# Patient Record
Sex: FEMALE | Race: WHITE | NOT HISPANIC OR LATINO | URBAN - METROPOLITAN AREA
[De-identification: names, ages, dates, MRNs, and addresses within clinical notes are randomized per-mention and may not be internally consistent; named-entity substitution may affect disease eponyms.]

---

## 2018-06-18 ENCOUNTER — OFFICE VISIT (OUTPATIENT)
Dept: URBAN - METROPOLITAN AREA CLINIC 193 | Facility: CLINIC | Age: 78
End: 2018-06-18
Payer: COMMERCIAL

## 2018-06-18 DIAGNOSIS — H04.123 DRY EYE SYNDROME OF BILATERAL LACRIMAL GLANDS: ICD-10-CM

## 2018-06-18 DIAGNOSIS — H02.423 MYOGENIC PTOSIS OF BILATERAL EYELIDS: ICD-10-CM

## 2018-06-18 DIAGNOSIS — H43.813 VITREOUS DEGENERATION, BILATERAL: ICD-10-CM

## 2018-06-18 PROCEDURE — 92014 COMPRE OPH EXAM EST PT 1/>: CPT | Performed by: OPTOMETRIST

## 2018-06-18 ASSESSMENT — KERATOMETRY
OD: 42.25
OS: 41.75

## 2018-06-18 ASSESSMENT — VISUAL ACUITY
OS: 20/40-
OD: 20/40+

## 2018-06-18 ASSESSMENT — INTRAOCULAR PRESSURE
OD: 15
OS: 15

## 2018-06-18 NOTE — IMPRESSION/PLAN
Impression: Age-related nuclear cataract, bilateral: H25.13. Vision affected. Plan: Cataracts account for the patient's complaints. Patient understands changing glasses will not significantly improve vision. Cataract surgery should improve vision. Patient willing to have surgery. Recommend cataract surgery OU. OD first. Premium lenses discussed. Consider STANDARD TORIC MULTIFOCAL lenses. DISTANCE refractive target. No reported health issues that should hinder surgery. Will not help or hurt droopy eyelids.

## 2018-06-18 NOTE — IMPRESSION/PLAN
Impression: Dry eye syndrome of bilateral lacrimal glands: H04.123. Doing warm compresses and using Visine ATs. Using air purifier instead of humidifier. Plan: Continue ATs PRN. Preservative free artificial tears if more than QID. Continue warm compresses 2 x day, humidifier, blink frequently particularly when on the computer/tv/reading. Call if worsens.

## 2018-07-02 ENCOUNTER — PRE-OPERATIVE VISIT (OUTPATIENT)
Dept: URBAN - METROPOLITAN AREA CLINIC 193 | Facility: CLINIC | Age: 78
End: 2018-07-02
Payer: COMMERCIAL

## 2018-07-02 PROCEDURE — 92136 OPHTHALMIC BIOMETRY: CPT | Performed by: OPHTHALMOLOGY

## 2018-07-12 ENCOUNTER — OFFICE VISIT (OUTPATIENT)
Dept: URBAN - METROPOLITAN AREA CLINIC 193 | Facility: CLINIC | Age: 78
End: 2018-07-12
Payer: COMMERCIAL

## 2018-07-12 DIAGNOSIS — H52.4 PRESBYOPIA: ICD-10-CM

## 2018-07-12 DIAGNOSIS — H25.13 AGE-RELATED NUCLEAR CATARACT, BILATERAL: Primary | ICD-10-CM

## 2018-07-12 PROCEDURE — 99204 OFFICE O/P NEW MOD 45 MIN: CPT | Performed by: OPHTHALMOLOGY

## 2018-07-12 PROCEDURE — 76519 ECHO EXAM OF EYE: CPT | Performed by: OPHTHALMOLOGY

## 2018-07-12 PROCEDURE — 99214 OFFICE O/P EST MOD 30 MIN: CPT | Performed by: OPHTHALMOLOGY

## 2018-07-12 RX ORDER — OFLOXACIN 3 MG/ML
0.3 % SOLUTION/ DROPS OPHTHALMIC
Qty: 1 | Refills: 1 | Status: INACTIVE
Start: 2018-07-12 | End: 2018-08-23

## 2018-07-12 RX ORDER — DUREZOL 0.5 MG/ML
0.05 % EMULSION OPHTHALMIC
Qty: 1 | Refills: 2 | Status: ACTIVE
Start: 2018-07-12

## 2018-07-12 ASSESSMENT — PACHYMETRY
OD: 24.22
OS: 24.36

## 2018-07-12 ASSESSMENT — VISUAL ACUITY
OD: 20/40-
OS: 20/40-

## 2018-07-12 ASSESSMENT — INTRAOCULAR PRESSURE
OD: 15
OS: 16

## 2018-07-12 ASSESSMENT — KERATOMETRY: OS: 42.00

## 2018-07-12 NOTE — IMPRESSION/PLAN
Impression: Age-related nuclear cataract, bilateral: H25.13.  OU. Visually significant Plan: Cataracts account for the patient's complaints. Discussed all risks, benefits, procedures and recovery. Patient understands changing glasses will not improve vision. Discussed added risk due to astigmatism. Patient desires to have surgery, recommend CE IOL OU, OD first.  Discussed iol options, recommend STANDARD/TORIC (patient would like to proceed with STANDARD)   IOL . TARGET: DISTANCE   RL2 Discussed  astigmatism diagnosis with Patient. Patient understands  that Standard lens does not correct for Astigmatism and patient will need gls distance and near after Cataract Surgery. Patient can consider toric iol advised additional testing will need to be done for final iol recommendations.   Patient understands

## 2018-07-12 NOTE — IMPRESSION/PLAN
Impression: Dry eye syndrome of bilateral lacrimal glands: H04.123. Plan: Explained condition does not have a cure and will need artificial tears for maintenance.

## 2018-07-18 ENCOUNTER — SURGERY (OUTPATIENT)
Dept: URBAN - METROPOLITAN AREA SURGERY 124 | Facility: SURGERY | Age: 78
End: 2018-07-18
Payer: COMMERCIAL

## 2018-07-18 PROCEDURE — 66984 XCAPSL CTRC RMVL W/O ECP: CPT | Performed by: OPHTHALMOLOGY

## 2018-07-19 ENCOUNTER — POST-OPERATIVE VISIT (OUTPATIENT)
Dept: URBAN - METROPOLITAN AREA CLINIC 193 | Facility: CLINIC | Age: 78
End: 2018-07-19

## 2018-07-19 PROCEDURE — 99024 POSTOP FOLLOW-UP VISIT: CPT | Performed by: OPTOMETRIST

## 2018-07-19 ASSESSMENT — INTRAOCULAR PRESSURE
OD: 21
OS: 15

## 2018-07-25 ENCOUNTER — POST-OPERATIVE VISIT (OUTPATIENT)
Dept: URBAN - METROPOLITAN AREA CLINIC 193 | Facility: CLINIC | Age: 78
End: 2018-07-25

## 2018-07-25 DIAGNOSIS — Z09 ENCNTR FOR F/U EXAM AFT TRTMT FOR COND OTH THAN MALIG NEOPLM: Primary | ICD-10-CM

## 2018-07-25 PROCEDURE — 99024 POSTOP FOLLOW-UP VISIT: CPT | Performed by: OPTOMETRIST

## 2018-07-25 ASSESSMENT — INTRAOCULAR PRESSURE
OS: 15
OD: 18

## 2018-08-01 ENCOUNTER — SURGERY (OUTPATIENT)
Dept: URBAN - METROPOLITAN AREA SURGERY 124 | Facility: SURGERY | Age: 78
End: 2018-08-01
Payer: COMMERCIAL

## 2018-08-01 PROCEDURE — 66984 XCAPSL CTRC RMVL W/O ECP: CPT | Performed by: OPHTHALMOLOGY

## 2018-08-02 ENCOUNTER — POST-OPERATIVE VISIT (OUTPATIENT)
Dept: URBAN - METROPOLITAN AREA CLINIC 193 | Facility: CLINIC | Age: 78
End: 2018-08-02

## 2018-08-02 PROCEDURE — 99024 POSTOP FOLLOW-UP VISIT: CPT | Performed by: OPTOMETRIST

## 2018-08-02 ASSESSMENT — INTRAOCULAR PRESSURE
OD: 18
OS: 21

## 2018-08-08 ENCOUNTER — POST-OPERATIVE VISIT (OUTPATIENT)
Dept: URBAN - METROPOLITAN AREA CLINIC 193 | Facility: CLINIC | Age: 78
End: 2018-08-08

## 2018-08-08 PROCEDURE — 99024 POSTOP FOLLOW-UP VISIT: CPT | Performed by: OPTOMETRIST

## 2018-08-08 ASSESSMENT — INTRAOCULAR PRESSURE
OS: 16
OD: 16

## 2018-08-23 ENCOUNTER — POST-OPERATIVE VISIT (OUTPATIENT)
Dept: URBAN - METROPOLITAN AREA CLINIC 193 | Facility: CLINIC | Age: 78
End: 2018-08-23

## 2018-08-23 PROCEDURE — 99024 POSTOP FOLLOW-UP VISIT: CPT | Performed by: OPHTHALMOLOGY

## 2018-08-23 ASSESSMENT — VISUAL ACUITY
OD: 20/30+
OS: 20/30+

## 2018-08-23 ASSESSMENT — INTRAOCULAR PRESSURE
OD: 18
OS: 16

## 2018-09-13 ENCOUNTER — POST-OPERATIVE VISIT (OUTPATIENT)
Dept: URBAN - METROPOLITAN AREA CLINIC 193 | Facility: CLINIC | Age: 78
End: 2018-09-13

## 2018-09-13 PROCEDURE — 99024 POSTOP FOLLOW-UP VISIT: CPT | Performed by: OPTOMETRIST

## 2018-09-13 ASSESSMENT — VISUAL ACUITY
OD: 20/30+
OS: 20/30+

## 2018-09-13 ASSESSMENT — INTRAOCULAR PRESSURE
OS: 15
OD: 15

## 2020-01-16 ENCOUNTER — Encounter (OUTPATIENT)
Dept: URBAN - METROPOLITAN AREA CLINIC 71 | Facility: CLINIC | Age: 80
End: 2020-01-16
Payer: COMMERCIAL

## 2020-01-16 PROCEDURE — 92014 COMPRE OPH EXAM EST PT 1/>: CPT | Performed by: OPTOMETRIST

## 2020-02-06 ENCOUNTER — OFFICE VISIT (OUTPATIENT)
Dept: URBAN - METROPOLITAN AREA CLINIC 71 | Facility: CLINIC | Age: 80
End: 2020-02-06
Payer: COMMERCIAL

## 2020-02-06 PROCEDURE — 92014 COMPRE OPH EXAM EST PT 1/>: CPT | Performed by: OPTOMETRIST

## 2020-02-06 ASSESSMENT — INTRAOCULAR PRESSURE
OD: 15
OS: 17

## 2020-02-06 NOTE — IMPRESSION/PLAN
Impression: Vitreous degeneration, bilateral: H43.813. Bilateral.
Optos 02/06/2020 Plan: The PVD is stable, and there is no evidence of a retinal tear or detachment on dilated exam with scleral depression. I again reviewed the signs and symptoms of a retinal tear and detachment in detail with the patient, including worsening flashes, new floaters, and development of a shadow/curtain in the peripheral visual field. The patient was advised to call immediately with any changes to [address] [facility] or increase in symptoms.

## 2020-02-06 NOTE — IMPRESSION/PLAN
Impression: Dry eye syndrome of bilateral lacrimal glands: H04.123. Bilateral. Plan: Continue Systane as needed.

## 2020-07-16 ENCOUNTER — OFFICE VISIT (OUTPATIENT)
Dept: URBAN - METROPOLITAN AREA CLINIC 71 | Facility: CLINIC | Age: 80
End: 2020-07-16
Payer: COMMERCIAL

## 2020-07-16 DIAGNOSIS — Z96.1 PRESENCE OF INTRAOCULAR LENS: ICD-10-CM

## 2020-07-16 DIAGNOSIS — G43.109 MIGRAINE W/ AURA: Primary | ICD-10-CM

## 2020-07-16 PROCEDURE — 92014 COMPRE OPH EXAM EST PT 1/>: CPT | Performed by: OPTOMETRIST

## 2020-07-16 ASSESSMENT — INTRAOCULAR PRESSURE
OS: 18
OD: 15

## 2020-07-16 NOTE — IMPRESSION/PLAN
Impression: Migraine w/ aura: G43.109. Plan: OU: Discussed diagnosis in detail with patient. Pt advised to watch frequency and severity and watch for possible triggers.  Call if worsening

## 2021-02-11 ENCOUNTER — OFFICE VISIT (OUTPATIENT)
Dept: URBAN - METROPOLITAN AREA CLINIC 71 | Facility: CLINIC | Age: 81
End: 2021-02-11
Payer: COMMERCIAL

## 2021-02-11 DIAGNOSIS — H52.223 REGULAR ASTIGMATISM, BILATERAL: ICD-10-CM

## 2021-02-11 PROCEDURE — 99214 OFFICE O/P EST MOD 30 MIN: CPT | Performed by: OPTOMETRIST

## 2021-02-11 ASSESSMENT — VISUAL ACUITY
OS: 20/25
OD: 20/25

## 2021-02-11 ASSESSMENT — INTRAOCULAR PRESSURE
OS: 15
OD: 16

## 2021-02-11 NOTE — IMPRESSION/PLAN
Impression: Regular astigmatism, bilateral: H52.223. Plan: Astigmatism causes blurred vision due to either an irregularly shaped cornea or the curvature of the lens. Small amounts of astigmatism do not need to be treated, but larger amounts can cause visual distortion, blurred vision, eye strain and headaches. New glasses RX given today.

## 2022-04-06 NOTE — IMPRESSION/PLAN
Impression: Dry eye syndrome of bilateral lacrimal glands: H04.123 Bilateral. Plan: Dry eye syndrome requires lubrication of the eye with artificial tears and nighttime ointments or gels. In addition, topical and oral anti-inflammatory medications are usually necessary to treat the associated ocular irritation. Contact the office if dry eye does not improve, worsens or blurs vision. Recommend 3-4 drops daily of Systane Balance. 30.1

## 2022-04-13 ENCOUNTER — OFFICE VISIT (OUTPATIENT)
Dept: URBAN - METROPOLITAN AREA CLINIC 72 | Facility: CLINIC | Age: 82
End: 2022-04-13
Payer: COMMERCIAL

## 2022-04-13 DIAGNOSIS — H04.123 DRY EYE SYNDROME OF BILATERAL LACRIMAL GLANDS: ICD-10-CM

## 2022-04-13 DIAGNOSIS — H43.813 VITREOUS DEGENERATION, BILATERAL: Primary | ICD-10-CM

## 2022-04-13 DIAGNOSIS — H02.889 MEIBOMIAN GLAND DYSFUNCTION OF EYE: ICD-10-CM

## 2022-04-13 PROCEDURE — 99213 OFFICE O/P EST LOW 20 MIN: CPT

## 2022-04-13 NOTE — IMPRESSION/PLAN
Impression: Dry eye syndrome of bilateral lacrimal glands: H04.123 Bilateral. Plan: Recommend pt to continue with OTC artificial tear use 2-4x daily w/ emphasis on QAM and QHS doses. Discussed possible improvement with thicker gel or ointment QHS. Recommend warm compress w/ lid massage. If symptoms continue/worsen will consider prescription drug therapy.

## 2022-04-13 NOTE — IMPRESSION/PLAN
Impression: Vitreous degeneration, bilateral: H43.813. Optos ordered and done today PVD, no holes/tear/hemes OU Plan: Discussed diagnosis in detail with patient. No treatment is required at this time. Reassured patient of current condition and treatment. Will continue to observe condition and or symptoms. Discussed signs and symptoms of PVD/floaters. Discussed signs and symptoms of retinal detachment.

## 2023-04-19 ENCOUNTER — OFFICE VISIT (OUTPATIENT)
Dept: URBAN - METROPOLITAN AREA CLINIC 72 | Facility: CLINIC | Age: 83
End: 2023-04-19
Payer: MEDICARE

## 2023-04-19 DIAGNOSIS — H04.123 DRY EYE SYNDROME OF BILATERAL LACRIMAL GLANDS: Primary | ICD-10-CM

## 2023-04-19 DIAGNOSIS — H00.023 HORDEOLUM INTERNUM OF RIGHT EYELID: ICD-10-CM

## 2023-04-19 DIAGNOSIS — H01.8 OTHER SPECIFIED INFLAMMATION OF EYELID: ICD-10-CM

## 2023-04-19 PROCEDURE — 99212 OFFICE O/P EST SF 10 MIN: CPT

## 2023-04-19 NOTE — IMPRESSION/PLAN
Impression: Other specified inflammation of eyelid: H01.8. Scurf OU Plan: Discussed diagnosis in detail with patient. Discussed treatment options with patient. Patient instructed to apply warm compresses. Lid scrubs and hygiene were explained. Patient instructed to use artificial tears as needed.

## 2023-04-19 NOTE — IMPRESSION/PLAN
Impression: Hordeolum internum of right eyelid: H00.023. Plan: Discussed DX and treatment options with pt Explained to pt that there is no active infection, but gland is still clogged. Recommend pt to continue with warm compresses and mina. Educated pt on lid scrubs, pt to use at least BID at this time.   
Pt voices understanding

## 2024-01-31 ENCOUNTER — OFFICE VISIT (OUTPATIENT)
Dept: URBAN - METROPOLITAN AREA CLINIC 72 | Facility: CLINIC | Age: 84
End: 2024-01-31
Payer: MEDICARE

## 2024-01-31 DIAGNOSIS — H00.023 HORDEOLUM INTERNUM OF RIGHT EYELID: ICD-10-CM

## 2024-01-31 DIAGNOSIS — H43.813 VITREOUS DEGENERATION, BILATERAL: Primary | ICD-10-CM

## 2024-01-31 DIAGNOSIS — H35.373 PUCKERING OF MACULA, BILATERAL: ICD-10-CM

## 2024-01-31 PROCEDURE — 99213 OFFICE O/P EST LOW 20 MIN: CPT

## 2024-01-31 RX ORDER — AMOXICILLIN AND CLAVULANATE POTASSIUM 500; 125 MG/1; 1/1
TABLET, FILM COATED ORAL
Qty: 20 | Refills: 0 | Status: ACTIVE
Start: 2024-01-31

## 2024-01-31 ASSESSMENT — INTRAOCULAR PRESSURE
OD: 16
OS: 16

## 2025-08-26 ENCOUNTER — APPOINTMENT (OUTPATIENT)
Dept: URBAN - METROPOLITAN AREA CLINIC 158 | Facility: CLINIC | Age: 85
Setting detail: DERMATOLOGY
End: 2025-08-26

## 2025-08-26 DIAGNOSIS — D18.0 HEMANGIOMA: ICD-10-CM

## 2025-08-26 DIAGNOSIS — L82.0 INFLAMED SEBORRHEIC KERATOSIS: ICD-10-CM

## 2025-08-26 DIAGNOSIS — L82.1 OTHER SEBORRHEIC KERATOSIS: ICD-10-CM

## 2025-08-26 DIAGNOSIS — L81.4 OTHER MELANIN HYPERPIGMENTATION: ICD-10-CM

## 2025-08-26 PROBLEM — D48.5 NEOPLASM OF UNCERTAIN BEHAVIOR OF SKIN: Status: ACTIVE | Noted: 2025-08-26

## 2025-08-26 PROBLEM — D18.01 HEMANGIOMA OF SKIN AND SUBCUTANEOUS TISSUE: Status: ACTIVE | Noted: 2025-08-26

## 2025-08-26 PROCEDURE — ? PATIENT SPECIFIC COUNSELING

## 2025-08-26 PROCEDURE — ? BIOPSY BY SHAVE METHOD

## 2025-08-26 PROCEDURE — ? COUNSELING

## 2025-08-26 ASSESSMENT — LOCATION DETAILED DESCRIPTION DERM
LOCATION DETAILED: RIGHT SUPERIOR UPPER BACK
LOCATION DETAILED: LEFT MEDIAL SUPERIOR CHEST
LOCATION DETAILED: RIGHT SUPERIOR LATERAL UPPER BACK
LOCATION DETAILED: RIGHT POSTERIOR SHOULDER
LOCATION DETAILED: RIGHT INFERIOR UPPER BACK
LOCATION DETAILED: MIDDLE STERNUM
LOCATION DETAILED: LEFT POSTERIOR SHOULDER
LOCATION DETAILED: RIGHT CLAVICULAR SKIN
LOCATION DETAILED: LEFT MEDIAL BREAST 10-11:00 REGION
LOCATION DETAILED: RIGHT SUPERIOR MEDIAL UPPER BACK
LOCATION DETAILED: INFERIOR THORACIC SPINE

## 2025-08-26 ASSESSMENT — LOCATION ZONE DERM
LOCATION ZONE: ARM
LOCATION ZONE: TRUNK

## 2025-08-26 ASSESSMENT — LOCATION SIMPLE DESCRIPTION DERM
LOCATION SIMPLE: LEFT BREAST
LOCATION SIMPLE: RIGHT BACK
LOCATION SIMPLE: RIGHT UPPER BACK
LOCATION SIMPLE: LEFT SHOULDER
LOCATION SIMPLE: CHEST
LOCATION SIMPLE: RIGHT CLAVICULAR SKIN
LOCATION SIMPLE: RIGHT SHOULDER
LOCATION SIMPLE: UPPER BACK